# Patient Record
(demographics unavailable — no encounter records)

---

## 2024-11-03 NOTE — ASSESSMENT
[FreeTextEntry1] : 10 year 7 months old obese female who is referred for initial evaluation of concerns about growth and pubertal development.  Mom believes that her peers are taller and less developed in puberty.   Mother reports breast development and signs of pubarche noted after 7 y/o.so puberty does not seem precocious by report. She has a carb heavy diet but is physically active   At today's visit, patient is on the 28th percentile and 87th percentile for weight with a BMI of 96the percentile Unfortunately, I don't have recent growth data from PMD available to comment on her recent growth.   On physical exam she is an obese girl that is in central puberty - breast development difficult to stage due to obesity - Jc 3?  -I discussed with mom that Airam is on the 28th percentile for height and is thus not short.  However, it is important to evaluate her growth trajectory over time and review her growth charts to assure she has been growing steadily.   -Requested MA to obtain patient's growth chart from birth to most recent visit from PMD  -We discussed that based on her BMI , she is obese and thus needs to limit carbs/portion sizes, elimiinate sugary drinks and continue regular physical activity.  -Discussed complications of obesity  -Advised to obtain fasting BW  -Advised to obtain BA to evluate skeletal maturation  RTC in 4 months

## 2024-11-03 NOTE — FAMILY HISTORY
[___ inches] : [unfilled] inches [de-identified] : from [place]; deny consanguinity  [FreeTextEntry1] : from [place]  [FreeTextEntry3] : +/-2SDs  [FreeTextEntry5] : 12-14 y/o  [FreeTextEntry2] : 15 y/o brother and 12 y/o brother

## 2024-11-03 NOTE — PHYSICAL EXAM
[Interactive] : interactive [Obese] : obese [Normal Appearance] : normal appearance [Well formed] : well formed [WNL for age] : within normal limits of age [Normal S1 and S2] : normal S1 and S2 [Clear to Ausculation Bilaterally] : clear to auscultation bilaterally [Abdomen Soft] : soft [Abdomen Tenderness] : non-tender [] : no hepatosplenomegaly [Normal] : normal  [Acanthosis Nigricans___] : no acanthosis nigricans [Goiter] : no goiter [Murmur] : no murmurs [de-identified] :  breast tissue appreciated behind the areola and ? spreading beyond the areola (Tanner3?) but exam is difficult due to adipomastia ,  mild axillary hair, PH Jc 3

## 2024-11-03 NOTE — FAMILY HISTORY
[___ inches] : [unfilled] inches [de-identified] : from [place]; deny consanguinity  [FreeTextEntry1] : from [place]  [FreeTextEntry3] : +/-2SDs  [FreeTextEntry5] : 12-12 y/o  [FreeTextEntry2] : 15 y/o brother and 10 y/o brother

## 2024-11-03 NOTE — REVIEW OF SYSTEMS
[Nl] : Neurological [Cold Intolerance] : no intolerance to cold [Heat Intolerance] : no intolerance to heat [Polydypsia] : no polydipsia [Polyuria] : no polyuria [FreeTextEntry2] : concern about growth

## 2024-11-03 NOTE — HISTORY OF PRESENT ILLNESS
[Premenarchal] : premenarchal [FreeTextEntry2] : Airam is an obese 10 year 8 y/o female who is referred by her PMD for evaluation due to maternal concern about her growth/puberty.   Mom believes that her peers are taller and but are less developed in puberty.    Last visit: 02/2024 (initial visit)  BW? BA?   Obtained growth data from PMD (not available at last visit): Points from 5 y/o to 10 y/o available  Height: around the 25th percentile  Weight: At the 50th percentile at 5 y/o with gradual increase to abvoe the 85th percentile by 10 y/o    Reports breasts that developed between 8 and 8 y/o Pubic hair developed between 8 and 8 y/o  Axillary hair after 8 y/o Denied acne   Mom states she saw another pediatric endocrinologist in the past (cannot recall name) and was given "a Vitamin" (unsure of name of Vitamin).   Diet Recall: Breakfast: does not eat breakfast on school days; On weekends: beans or onion/tomato egg  Lunch: pasta from home or waffles with syrup and strawberries and grapes  Dinner: Shrimp with carrot/broccoli or Chicken with peas and potatoes or chopped meat with rice  Snacks: chips,  Drinks: mostly drinks water; most of the time no soda; drinks jose juan juice - 2x/week  Pizza: 2-3x/week, loves pasta Sports: Does track -2x/week, soccer-1x/week stopped swimming and gymnastics

## 2024-11-03 NOTE — PAST MEDICAL HISTORY
[At Term] : at term [Normal Vaginal Route] : by normal vaginal route [None] : there were no delivery complications [Age Appropriate] : age appropriate developmental milestones met [FreeTextEntry1] : Cannot recall BW or BL

## 2024-11-03 NOTE — PHYSICAL EXAM
[Interactive] : interactive [Obese] : obese [Normal Appearance] : normal appearance [Well formed] : well formed [WNL for age] : within normal limits of age [Normal S1 and S2] : normal S1 and S2 [Clear to Ausculation Bilaterally] : clear to auscultation bilaterally [Abdomen Soft] : soft [Abdomen Tenderness] : non-tender [] : no hepatosplenomegaly [Normal] : normal  [Acanthosis Nigricans___] : no acanthosis nigricans [Goiter] : no goiter [Murmur] : no murmurs [de-identified] :  breast tissue appreciated behind the areola and ? spreading beyond the areola (Tanner3?) but exam is difficult due to adipomastia ,  mild axillary hair, PH Jc 3

## 2024-11-03 NOTE — CONSULT LETTER
[Dear  ___] : Dear  [unfilled], [Consult Letter:] : I had the pleasure of evaluating your patient, [unfilled]. [( Thank you for referring [unfilled] for consultation for _____ )] : Thank you for referring [unfilled] for consultation for [unfilled] [Please see my note below.] : Please see my note below. [Consult Closing:] : Thank you very much for allowing me to participate in the care of this patient.  If you have any questions, please do not hesitate to contact me. [Sincerely,] : Sincerely, [FreeTextEntry3] : Samantha De Los Santos MD Pediatric Endocrinology [facility]

## 2024-11-03 NOTE — DATA REVIEWED
[FreeTextEntry1] : Review of Laboratory Evaluation 06/08/2023 Biorference CMP: BG 99, no transaminitis, Cr 0.39 - normal  Lipid Panel: , TG 82, HDL 49,   CBC: Hg 13.7  HgA1C 5.6% TSH 1.490 (0.875-4.781)    Review of Growth Chart from PMD Two points available 0 months and 3 months  Lenth at ~10th percentile around 0 months and <5th percentile at 3 months  Weight around the 50th-75th percentile in the same time periods   Points from 5 y/o to 10 y/o:   Height: around the 25th percentile  Weight: At the 50th percentile at 5 y/o with gradual increase to abvoe the 85th percentile by 10 y/o

## 2024-11-03 NOTE — CONSULT LETTER
[Dear  ___] : Dear  [unfilled], [Consult Letter:] : I had the pleasure of evaluating your patient, [unfilled]. [( Thank you for referring [unfilled] for consultation for _____ )] : Thank you for referring [unfilled] for consultation for [unfilled] [Please see my note below.] : Please see my note below. [Consult Closing:] : Thank you very much for allowing me to participate in the care of this patient.  If you have any questions, please do not hesitate to contact me. [Sincerely,] : Sincerely, [FreeTextEntry3] : Samantha De Los Santos MD Pediatric Endocrinology [facility]

## 2024-11-03 NOTE — HISTORY OF PRESENT ILLNESS
[Premenarchal] : premenarchal [FreeTextEntry2] : Airam is an obese 10 year 8 y/o female who is referred by her PMD for evaluation due to maternal concern about her growth/puberty.   Mom believes that her peers are taller and but are less developed in puberty.    Last visit: 02/2024 (initial visit)  BW? BA?   Obtained growth data from PMD (not available at last visit): Points from 3 y/o to 10 y/o available  Height: around the 25th percentile  Weight: At the 50th percentile at 3 y/o with gradual increase to abvoe the 85th percentile by 10 y/o    Reports breasts that developed between 8 and 8 y/o Pubic hair developed between 8 and 8 y/o  Axillary hair after 8 y/o Denied acne   Mom states she saw another pediatric endocrinologist in the past (cannot recall name) and was given "a Vitamin" (unsure of name of Vitamin).   Diet Recall: Breakfast: does not eat breakfast on school days; On weekends: beans or onion/tomato egg  Lunch: pasta from home or waffles with syrup and strawberries and grapes  Dinner: Shrimp with carrot/broccoli or Chicken with peas and potatoes or chopped meat with rice  Snacks: chips,  Drinks: mostly drinks water; most of the time no soda; drinks jose juan juice - 2x/week  Pizza: 2-3x/week, loves pasta Sports: Does track -2x/week, soccer-1x/week stopped swimming and gymnastics